# Patient Record
Sex: MALE | Race: WHITE | NOT HISPANIC OR LATINO | Employment: OTHER | URBAN - METROPOLITAN AREA
[De-identification: names, ages, dates, MRNs, and addresses within clinical notes are randomized per-mention and may not be internally consistent; named-entity substitution may affect disease eponyms.]

---

## 2018-02-06 ENCOUNTER — TRANSCRIBE ORDERS (OUTPATIENT)
Dept: LAB | Facility: CLINIC | Age: 63
End: 2018-02-06

## 2018-02-06 ENCOUNTER — APPOINTMENT (OUTPATIENT)
Dept: LAB | Facility: CLINIC | Age: 63
End: 2018-02-06
Payer: COMMERCIAL

## 2018-02-06 ENCOUNTER — OFFICE VISIT (OUTPATIENT)
Dept: LAB | Facility: CLINIC | Age: 63
End: 2018-02-06
Payer: COMMERCIAL

## 2018-02-06 VITALS
HEART RATE: 71 BPM | BODY MASS INDEX: 28.13 KG/M2 | WEIGHT: 231 LBS | HEIGHT: 76 IN | SYSTOLIC BLOOD PRESSURE: 163 MMHG | DIASTOLIC BLOOD PRESSURE: 78 MMHG

## 2018-02-06 DIAGNOSIS — S83.231A COMPLEX TEAR OF MEDIAL MENISCUS OF RIGHT KNEE AS CURRENT INJURY, INITIAL ENCOUNTER: ICD-10-CM

## 2018-02-06 DIAGNOSIS — M17.11 PRIMARY LOCALIZED OSTEOARTHRITIS OF RIGHT KNEE: ICD-10-CM

## 2018-02-06 DIAGNOSIS — S83.231A COMPLEX TEAR OF MEDIAL MENISCUS OF RIGHT KNEE AS CURRENT INJURY, INITIAL ENCOUNTER: Primary | ICD-10-CM

## 2018-02-06 LAB
ANION GAP SERPL CALCULATED.3IONS-SCNC: 7 MMOL/L (ref 4–13)
BUN SERPL-MCNC: 18 MG/DL (ref 5–25)
CALCIUM SERPL-MCNC: 9.1 MG/DL (ref 8.3–10.1)
CHLORIDE SERPL-SCNC: 106 MMOL/L (ref 100–108)
CO2 SERPL-SCNC: 28 MMOL/L (ref 21–32)
CREAT SERPL-MCNC: 0.95 MG/DL (ref 0.6–1.3)
GFR SERPL CREATININE-BSD FRML MDRD: 84 ML/MIN/1.73SQ M
GLUCOSE SERPL-MCNC: 84 MG/DL (ref 65–140)
POTASSIUM SERPL-SCNC: 3.9 MMOL/L (ref 3.5–5.3)
SODIUM SERPL-SCNC: 141 MMOL/L (ref 136–145)

## 2018-02-06 PROCEDURE — 93005 ELECTROCARDIOGRAM TRACING: CPT

## 2018-02-06 PROCEDURE — 36415 COLL VENOUS BLD VENIPUNCTURE: CPT

## 2018-02-06 PROCEDURE — 80048 BASIC METABOLIC PNL TOTAL CA: CPT

## 2018-02-06 PROCEDURE — 99204 OFFICE O/P NEW MOD 45 MIN: CPT | Performed by: ORTHOPAEDIC SURGERY

## 2018-02-06 RX ORDER — BUPROPION HYDROCHLORIDE 150 MG/1
150 TABLET, EXTENDED RELEASE ORAL DAILY
COMMUNITY

## 2018-02-06 RX ORDER — LISINOPRIL AND HYDROCHLOROTHIAZIDE 12.5; 1 MG/1; MG/1
1 TABLET ORAL DAILY
Refills: 0 | COMMUNITY
Start: 2018-01-14

## 2018-02-06 RX ORDER — LISINOPRIL 5 MG/1
5 TABLET ORAL DAILY
COMMUNITY
End: 2018-02-08

## 2018-02-07 LAB
ATRIAL RATE: 64 BPM
P AXIS: 23 DEGREES
PR INTERVAL: 180 MS
QRS AXIS: -15 DEGREES
QRSD INTERVAL: 130 MS
QT INTERVAL: 424 MS
QTC INTERVAL: 437 MS
T WAVE AXIS: 19 DEGREES
VENTRICULAR RATE: 64 BPM

## 2018-02-07 PROCEDURE — 93010 ELECTROCARDIOGRAM REPORT: CPT | Performed by: INTERNAL MEDICINE

## 2018-02-08 RX ORDER — TADALAFIL 10 MG/1
10 TABLET ORAL DAILY PRN
Status: ON HOLD | COMMUNITY
End: 2018-03-08 | Stop reason: ALTCHOICE

## 2018-02-08 NOTE — PRE-PROCEDURE INSTRUCTIONS
Pre-Surgery Instructions:   Medication Instructions    buPROPion (ZYBAN) 150 MG 12 hr tablet Instructed patient per Anesthesia Guidelines   lisinopril-hydrochlorothiazide (PRINZIDE,ZESTORETIC) 10-12 5 MG per tablet Instructed patient per Anesthesia Guidelines   tadalafil (CIALIS) 10 MG tablet Instructed patient per Anesthesia Guidelines   VILAZODONE HCL PO Instructed patient per Anesthesia Guidelines  Pre op and bathing instructions reviewed   Pt has hibiclens

## 2018-02-22 ENCOUNTER — ANESTHESIA EVENT (OUTPATIENT)
Dept: PERIOP | Facility: AMBULARY SURGERY CENTER | Age: 63
End: 2018-02-22
Payer: COMMERCIAL

## 2018-03-08 ENCOUNTER — HOSPITAL ENCOUNTER (OUTPATIENT)
Facility: AMBULARY SURGERY CENTER | Age: 63
Setting detail: OUTPATIENT SURGERY
Discharge: HOME | End: 2018-03-08
Attending: ORTHOPAEDIC SURGERY | Admitting: ORTHOPAEDIC SURGERY
Payer: COMMERCIAL

## 2018-03-08 ENCOUNTER — ANESTHESIA (OUTPATIENT)
Dept: PERIOP | Facility: AMBULARY SURGERY CENTER | Age: 63
End: 2018-03-08
Payer: COMMERCIAL

## 2018-03-08 VITALS
HEIGHT: 72 IN | SYSTOLIC BLOOD PRESSURE: 110 MMHG | DIASTOLIC BLOOD PRESSURE: 61 MMHG | WEIGHT: 232 LBS | RESPIRATION RATE: 18 BRPM | OXYGEN SATURATION: 95 % | HEART RATE: 67 BPM | TEMPERATURE: 97.2 F | BODY MASS INDEX: 31.42 KG/M2

## 2018-03-08 DIAGNOSIS — S83.231A COMPLEX TEAR OF MEDIAL MENISCUS OF RIGHT KNEE AS CURRENT INJURY, INITIAL ENCOUNTER: Primary | ICD-10-CM

## 2018-03-08 PROBLEM — M17.11 PRIMARY OSTEOARTHRITIS OF RIGHT KNEE: Chronic | Status: ACTIVE | Noted: 2018-02-06

## 2018-03-08 PROCEDURE — 29881 ARTHRS KNE SRG MNISECTMY M/L: CPT | Performed by: ORTHOPAEDIC SURGERY

## 2018-03-08 RX ORDER — ONDANSETRON 2 MG/ML
4 INJECTION INTRAMUSCULAR; INTRAVENOUS ONCE AS NEEDED
Status: DISCONTINUED | OUTPATIENT
Start: 2018-03-08 | End: 2018-03-08 | Stop reason: HOSPADM

## 2018-03-08 RX ORDER — SODIUM CHLORIDE 9 MG/ML
100 INJECTION, SOLUTION INTRAVENOUS CONTINUOUS
Status: CANCELLED | OUTPATIENT
Start: 2018-03-08 | End: 2018-03-08

## 2018-03-08 RX ORDER — HYDROCODONE BITARTRATE AND ACETAMINOPHEN 5; 325 MG/1; MG/1
TABLET ORAL
Qty: 25 TABLET | Refills: 0 | Status: SHIPPED | OUTPATIENT
Start: 2018-03-08 | End: 2018-03-13 | Stop reason: SDUPTHER

## 2018-03-08 RX ORDER — MORPHINE SULFATE 2 MG/ML
2 INJECTION, SOLUTION INTRAMUSCULAR; INTRAVENOUS EVERY 2 HOUR PRN
Status: DISCONTINUED | OUTPATIENT
Start: 2018-03-08 | End: 2018-03-08 | Stop reason: HOSPADM

## 2018-03-08 RX ORDER — HYDROCODONE BITARTRATE AND ACETAMINOPHEN 5; 325 MG/1; MG/1
2 TABLET ORAL EVERY 4 HOURS PRN
Status: DISCONTINUED | OUTPATIENT
Start: 2018-03-08 | End: 2018-03-08 | Stop reason: HOSPADM

## 2018-03-08 RX ORDER — MIDAZOLAM HYDROCHLORIDE 1 MG/ML
INJECTION INTRAMUSCULAR; INTRAVENOUS AS NEEDED
Status: DISCONTINUED | OUTPATIENT
Start: 2018-03-08 | End: 2018-03-08 | Stop reason: SURG

## 2018-03-08 RX ORDER — ONDANSETRON 2 MG/ML
4 INJECTION INTRAMUSCULAR; INTRAVENOUS EVERY 6 HOURS PRN
Status: DISCONTINUED | OUTPATIENT
Start: 2018-03-08 | End: 2018-03-08 | Stop reason: HOSPADM

## 2018-03-08 RX ORDER — PROPOFOL 10 MG/ML
INJECTION, EMULSION INTRAVENOUS AS NEEDED
Status: DISCONTINUED | OUTPATIENT
Start: 2018-03-08 | End: 2018-03-08 | Stop reason: SURG

## 2018-03-08 RX ORDER — SODIUM CHLORIDE 9 MG/ML
INJECTION, SOLUTION INTRAVENOUS CONTINUOUS PRN
Status: DISCONTINUED | OUTPATIENT
Start: 2018-03-08 | End: 2018-03-08 | Stop reason: SURG

## 2018-03-08 RX ORDER — ONDANSETRON 2 MG/ML
INJECTION INTRAMUSCULAR; INTRAVENOUS AS NEEDED
Status: DISCONTINUED | OUTPATIENT
Start: 2018-03-08 | End: 2018-03-08 | Stop reason: SURG

## 2018-03-08 RX ORDER — ACETAMINOPHEN 325 MG/1
650 TABLET ORAL EVERY 4 HOURS PRN
Status: DISCONTINUED | OUTPATIENT
Start: 2018-03-08 | End: 2018-03-08 | Stop reason: HOSPADM

## 2018-03-08 RX ORDER — LIDOCAINE HYDROCHLORIDE 10 MG/ML
INJECTION, SOLUTION INFILTRATION; PERINEURAL AS NEEDED
Status: DISCONTINUED | OUTPATIENT
Start: 2018-03-08 | End: 2018-03-08 | Stop reason: SURG

## 2018-03-08 RX ORDER — FENTANYL CITRATE/PF 50 MCG/ML
25 SYRINGE (ML) INJECTION
Status: DISCONTINUED | OUTPATIENT
Start: 2018-03-08 | End: 2018-03-08 | Stop reason: HOSPADM

## 2018-03-08 RX ORDER — EPHEDRINE SULFATE 50 MG/ML
INJECTION, SOLUTION INTRAVENOUS AS NEEDED
Status: DISCONTINUED | OUTPATIENT
Start: 2018-03-08 | End: 2018-03-08 | Stop reason: SURG

## 2018-03-08 RX ORDER — FENTANYL CITRATE 50 UG/ML
INJECTION, SOLUTION INTRAMUSCULAR; INTRAVENOUS AS NEEDED
Status: DISCONTINUED | OUTPATIENT
Start: 2018-03-08 | End: 2018-03-08 | Stop reason: SURG

## 2018-03-08 RX ADMIN — EPHEDRINE SULFATE 10 MG: 50 INJECTION, SOLUTION INTRAMUSCULAR; INTRAVENOUS; SUBCUTANEOUS at 08:09

## 2018-03-08 RX ADMIN — EPHEDRINE SULFATE 10 MG: 50 INJECTION, SOLUTION INTRAMUSCULAR; INTRAVENOUS; SUBCUTANEOUS at 07:49

## 2018-03-08 RX ADMIN — ONDANSETRON 4 MG: 2 INJECTION INTRAMUSCULAR; INTRAVENOUS at 07:40

## 2018-03-08 RX ADMIN — PROPOFOL 200 MG: 10 INJECTION, EMULSION INTRAVENOUS at 07:33

## 2018-03-08 RX ADMIN — MIDAZOLAM HYDROCHLORIDE 2 MG: 1 INJECTION, SOLUTION INTRAMUSCULAR; INTRAVENOUS at 07:30

## 2018-03-08 RX ADMIN — HYDROCODONE BITARTRATE AND ACETAMINOPHEN 2 TABLET: 5; 325 TABLET ORAL at 09:09

## 2018-03-08 RX ADMIN — LIDOCAINE HYDROCHLORIDE 50 MG: 10 INJECTION, SOLUTION INFILTRATION; PERINEURAL at 07:33

## 2018-03-08 RX ADMIN — SODIUM CHLORIDE: 0.9 INJECTION, SOLUTION INTRAVENOUS at 07:02

## 2018-03-08 RX ADMIN — CEFAZOLIN SODIUM 2000 MG: 2 SOLUTION INTRAVENOUS at 07:39

## 2018-03-08 RX ADMIN — FENTANYL CITRATE 100 MCG: 50 INJECTION, SOLUTION INTRAMUSCULAR; INTRAVENOUS at 07:33

## 2018-03-08 RX ADMIN — DEXAMETHASONE SODIUM PHOSPHATE 10 MG: 10 INJECTION INTRAMUSCULAR; INTRAVENOUS at 07:40

## 2018-03-08 NOTE — ANESTHESIA POSTPROCEDURE EVALUATION
Post-Op Assessment Note      CV Status:  Stable    Mental Status:  Alert and awake    Hydration Status:  Euvolemic    PONV Controlled:  Controlled    Airway Patency:  Patent    Post Op Vitals Reviewed: Yes          Staff: CRNA           BP   109/53   Temp 97 9 °F (36 6 °C) (03/08/18 0825)    Pulse     Resp 16 (03/08/18 0825)    SpO2 97 % (03/08/18 0825)

## 2018-03-08 NOTE — OP NOTE
OPERATIVE REPORT  PATIENT NAME: Maliha Kumar    :  1955  MRN: [id number]  Pt Location: [facility]  OR ROOM 06    SURGERY DATE: 3/8/2018    Surgeon(s) and Role:     * Genesis Falnnery MD - Primary    Preop Diagnosis:  Complex tear of medial meniscus of right knee as current injury, initial encounter [S83 231A]    Post-Op Diagnosis Codes:     * Complex tear of medial meniscus of right knee as current injury, initial encounter [S83 231A]     * Primary osteoarthritis of right knee [M17 11]    Procedure(s) (LRB):  RIGHT KNEE ARTHROSCOPIC PARTIAL MEDIAL MENISCECTOMY    Specimen(s):  * No specimens in log *    Estimated Blood Loss:   Minimal    Drains:  None    Anesthesia Type:   General    Complications:   None    Procedure and Technique:  The patient was identified in the pre-operative holding area, and the surgical site was marked by the surgeon  The patient was transported to the operating room and placed in the supine position on the OR table  General anesthesia was administered  The right knee was prepped and draped in the usual sterile fashion  Prophylactic antibiotics were given within 1 hour of incision  Following a surgical timeout, the portal sites were injected with 10 mL of a 1:1 mixture of 0 25% bupivacaine and 1% lidocaine with epinephrine  An anterolateral portal was established with the 15-blade scalpel  The 4 mm 30-degree arthroscopic camera was placed in the medial compartment  An anteromedial portal was established under direct visualization  The medial meniscus demonstrated extensive complex tearing of the posterior horn and body  The articular surfaces of the medial compartment demonstrated moderate degenerative changes with a large area of grade 3 chondromalacia on the medial femoral condyle  Grade 2 chondromalacia was present diffusely on the medial tibial plateau  Partial medial meniscectomy was performed with the basket forceps and arthroscopic shaver    The medial meniscus was debrided back to a stable margin  The anterior cruciate ligament demonstrated degenerative fraying but was otherwise intact and stable with arthroscopic probing  Small osteophytes were present within the intercondylar notch  The lateral meniscus was intact  The articular surfaces of the lateral compartment demonstrated mild degenerative changes with grade 2 chondromalacia on the lateral tibial plateau  The articular surfaces of the patellofemoral compartment demonstrated mild degenerative changes with grade 2 chondromalacia diffusely on the patella and trochlea  The knee was irrigated extensively with the arthroscopic irrigation fluid  The portal sites were closed with simple buried 4-0 Monocryl suture  Steri-Strips were placed to reinforce the closure  The knee was injected with 20 mL of the local anesthetic mixture  The wounds were dressed with sterile 4 x 4 gauze, Webril, and an Ace bandage  Anesthesia was reversed, and the patient was extubated      Patient Disposition:  PACU  and extubated and stable    SIGNATURE: Penelope Curran MD  DATE: March 8, 2018  TIME: 8:22 AM

## 2018-03-08 NOTE — ANESTHESIA PREPROCEDURE EVALUATION
Review of Systems/Medical History      No history of anesthetic complications     Cardiovascular  Hypertension ,    Pulmonary  Negative pulmonary ROS        GI/Hepatic  Negative GI/hepatic ROS          Negative  ROS        Endo/Other  Negative endo/other ROS      GYN       Hematology   Musculoskeletal    Arthritis     Neurology  Negative neurology ROS      Psychology   Anxiety, Depression ,              Physical Exam    Airway    Mallampati score: II  TM Distance: >3 FB  Neck ROM: full     Dental       Cardiovascular      Pulmonary      Other Findings        Anesthesia Plan  ASA Score- 2     Anesthesia Type- general with ASA Monitors  Additional Monitors:   Airway Plan:     Comment: General anesthesia, LMA; standard ASA monitors  Risks and benefits discussed with patient; patient consented and agrees to proceed  I saw and evaluated the patient  If seen with CRNA, we have discussed the anesthetic plan and I am in agreement that the plan is appropriate for the patient        Plan Factors-    Induction- intravenous  Postoperative Plan- Plan for postoperative opioid use  Informed Consent- Anesthetic plan and risks discussed with patient  I personally reviewed this patient with the CRNA  Discussed and agreed on the Anesthesia Plan with the CRNA  Corby Howard

## 2018-03-08 NOTE — H&P
Patient Name:  Alger Bosworth  MRN:  [id number]    Assessment & Plan    Right knee medial meniscus tear, possible ACL tear for right knee arthroscopic partial medial meniscectomy      Chief Complaint    Right knee pain      History of the Present Illness    49-year-old male with right knee pain since November 2017  Symptoms started suddenly after waking up from a colonoscopy  No known injury  MRI with complex tear medial meniscus and poorly visualized ACL  Moderate degenerative changes of the medial compartment were also noted on MRI  Review of Systems    General:  Negative for fever, lethargy/malaise, or night sweats  Respiratory:  Negative for cough or wheezing  Cardiovascular:  Negative for chest pain, dyspnea on exertion, or palpitations  Musculoskeletal:  As stated in the HPI and otherwise negative  Physical Exam    /76   Pulse 67   Temp (!) 97 2 °F (36 2 °C) (Temporal)   Resp 17   Ht 6' (1 829 m)   Wt 105 kg (232 lb)   SpO2 96%   BMI 31 46 kg/m²     Right knee:  Medial joint line tenderness  Range of motion is -5 degrees of extension and 125 degrees of flexion  Trace laxity with Lachman test     Constitutional:  Well-developed and well-nourished  Lungs:  Clear to auscultation bilaterally  Heart:  Regular rate and rhythm with audible S1 and S2   Skin:  Intact without erythema        Lab Results   Component Value Date     02/06/2018    K 3 9 02/06/2018    CO2 28 02/06/2018     02/06/2018    BUN 18 02/06/2018    CREATININE 0 95 02/06/2018    GLUCOSE 84 02/06/2018    CALCIUM 9 1 02/06/2018         Past Medical History:   Diagnosis Date    Anxiety     Arthritis     Depression     Hypertension        Past Surgical History:   Procedure Laterality Date    COLONOSCOPY      DENTAL SURGERY      KNEE ARTHROSCOPY Right 1992    menisectomy    KNEE SURGERY      PILONIDAL CYST DRAINAGE      UPPER GASTROINTESTINAL ENDOSCOPY         No Known Allergies    No current facility-administered medications on file prior to encounter        Current Outpatient Prescriptions on File Prior to Encounter   Medication Sig Dispense Refill    buPROPion (ZYBAN) 150 MG 12 hr tablet Take 150 mg by mouth daily        lisinopril-hydrochlorothiazide (PRINZIDE,ZESTORETIC) 10-12 5 MG per tablet Take 1 tablet by mouth daily  0    [DISCONTINUED] VILAZODONE HCL PO Take 20 mg by mouth           Social History   Substance Use Topics    Smoking status: Former Smoker     Quit date: 2/8/1992    Smokeless tobacco: Never Used    Alcohol use Yes      Comment: once weekly       Family History   Problem Relation Age of Onset    Hypertension Sister     Diabetes Sister

## 2018-03-13 DIAGNOSIS — S83.231A COMPLEX TEAR OF MEDIAL MENISCUS OF RIGHT KNEE AS CURRENT INJURY, INITIAL ENCOUNTER: ICD-10-CM

## 2018-03-13 RX ORDER — HYDROCODONE BITARTRATE AND ACETAMINOPHEN 5; 325 MG/1; MG/1
TABLET ORAL
Qty: 20 TABLET | Refills: 0 | Status: SHIPPED | OUTPATIENT
Start: 2018-03-13

## 2018-03-13 NOTE — TELEPHONE ENCOUNTER
Caller: Dr Eric Serrano patient  Ph: [phone]  Patient is requesting a refill for pain medicine from the right knee surgery he had  He uses [facility], [address] [place] [address]   Store # [id number]

## 2018-03-13 NOTE — TELEPHONE ENCOUNTER
Called patient let patient know script is ready to be picked up at [facility]

## 2018-03-21 NOTE — TELEPHONE ENCOUNTER
Dr Kandi Aguilar patient    John Carrera called to r/s his appt for today due to the weather  He is a post op patient and was told he could reschedule with Jenny Loving this week however I do not have access to that schedule  Best contact # [phone]    Thank you

## 2018-03-22 NOTE — TELEPHONE ENCOUNTER
I do not have my own schedule  He will need to be on dr Susan Estrella schedule   I work with dr Tomas Sumner

## 2018-03-23 ENCOUNTER — OFFICE VISIT (OUTPATIENT)
Dept: OBGYN CLINIC | Facility: CLINIC | Age: 63
End: 2018-03-23

## 2018-03-23 VITALS
DIASTOLIC BLOOD PRESSURE: 88 MMHG | BODY MASS INDEX: 28.01 KG/M2 | HEIGHT: 76 IN | SYSTOLIC BLOOD PRESSURE: 135 MMHG | HEART RATE: 73 BPM | WEIGHT: 230 LBS

## 2018-03-23 DIAGNOSIS — S83.231D COMPLEX TEAR OF MEDIAL MENISCUS OF RIGHT KNEE AS CURRENT INJURY, SUBSEQUENT ENCOUNTER: Primary | ICD-10-CM

## 2018-03-23 PROCEDURE — 99024 POSTOP FOLLOW-UP VISIT: CPT | Performed by: ORTHOPAEDIC SURGERY

## 2018-03-23 RX ORDER — INFLUENZA A VIRUS A/SINGAPORE/GP1908/2015 IVR-180A (H1N1) ANTIGEN (PROPIOLACTONE INACTIVATED), INFLUENZA A VIRUS A/HONG KONG/4801/2014 X-263B (H3N2) ANTIGEN (PROPIOLACTONE INACTIVATED), INFLUENZA B VIRUS B/BRISBANE/46/2015 ANTIGEN (PROPIOLACTONE INACTIVATED), AND INFLUENZA B VIRUS B/PHUKET/3073/2013 BVR-1B ANTIGEN (PROPIOLACTONE INACTIVATED) 15; 15; 15; 15 UG/.5ML; UG/.5ML; UG/.5ML; UG/.5ML
INJECTION, SUSPENSION INTRAMUSCULAR
Refills: 0 | COMMUNITY
Start: 2018-02-08

## 2018-03-23 RX ORDER — LORAZEPAM 1 MG/1
1 TABLET ORAL 3 TIMES DAILY PRN
Refills: 0 | COMMUNITY
Start: 2018-02-27

## 2018-03-23 RX ORDER — VILAZODONE HYDROCHLORIDE 20 MG/1
20 TABLET ORAL DAILY
Refills: 0 | COMMUNITY
Start: 2018-02-20

## 2018-03-23 NOTE — PROGRESS NOTES
Patient Name:  Sonali Murphy  MRN:  [id number]    Assessment & Plan    Right knee arthroscopic partial medial meniscectomy 3/8/18  1  Weightbearing as tolerated right lower extremity  2  Gradually return to normal activities as tolerated  3  NSAIDs as needed  4  Follow up in four weeks  Subjective    61-year-old male returns to the office today status post Right knee arthroscopic partial medial meniscectomy 3/8/18  Today he notes soreness about the right knee  He states his pain has improved significantly  No incisional erythema or drainage  He notes mild swelling which is improving  No significant stiffness  No weakness or instability  Objective    /88   Pulse 73   Ht 6' 4" (1 93 m)   Wt 104 kg (230 lb)   BMI 28 00 kg/m²     Right knee:  No gross deformity  Skin intact  Fully healed incisions that erythema or drainage  Small effusion  No significant joint line tenderness  Full range of motion without discomfort  Stable to varus and valgus stress  Sensation intact right lower extremity

## 2018-04-20 ENCOUNTER — OFFICE VISIT (OUTPATIENT)
Dept: OBGYN CLINIC | Facility: CLINIC | Age: 63
End: 2018-04-20

## 2018-04-20 VITALS
DIASTOLIC BLOOD PRESSURE: 84 MMHG | HEART RATE: 66 BPM | SYSTOLIC BLOOD PRESSURE: 134 MMHG | HEIGHT: 76 IN | BODY MASS INDEX: 28.86 KG/M2 | WEIGHT: 237 LBS

## 2018-04-20 DIAGNOSIS — M17.11 PRIMARY OSTEOARTHRITIS OF RIGHT KNEE: Chronic | ICD-10-CM

## 2018-04-20 DIAGNOSIS — S83.231D COMPLEX TEAR OF MEDIAL MENISCUS OF RIGHT KNEE AS CURRENT INJURY, SUBSEQUENT ENCOUNTER: Primary | ICD-10-CM

## 2018-04-20 PROCEDURE — 99024 POSTOP FOLLOW-UP VISIT: CPT | Performed by: ORTHOPAEDIC SURGERY

## 2018-04-20 RX ORDER — LOSARTAN POTASSIUM AND HYDROCHLOROTHIAZIDE 12.5; 1 MG/1; MG/1
1 TABLET ORAL DAILY
Refills: 0 | COMMUNITY
Start: 2018-03-26

## 2019-12-03 ENCOUNTER — TRANSCRIBE ORDERS (OUTPATIENT)
Dept: ADMINISTRATIVE | Facility: HOSPITAL | Age: 64
End: 2019-12-03

## 2019-12-03 DIAGNOSIS — R00.2 PALPITATIONS: ICD-10-CM

## 2019-12-03 DIAGNOSIS — R42 DIZZINESS AND GIDDINESS: Primary | ICD-10-CM

## 2020-01-03 ENCOUNTER — HOSPITAL ENCOUNTER (OUTPATIENT)
Dept: NON INVASIVE DIAGNOSTICS | Facility: HOSPITAL | Age: 65
Discharge: HOME | End: 2020-01-03
Payer: MEDICARE

## 2020-01-03 DIAGNOSIS — R00.2 PALPITATIONS: ICD-10-CM

## 2020-01-03 DIAGNOSIS — R42 DIZZINESS AND GIDDINESS: ICD-10-CM

## 2020-01-03 LAB
CHEST PAIN STATEMENT: NORMAL
MAX DIASTOLIC BP: 78 MMHG
MAX HEART RATE: 142 BPM
MAX PREDICTED HEART RATE: 154 BPM
MAX. SYSTOLIC BP: 184 MMHG
PROTOCOL NAME: NORMAL
TARGET HR FORMULA: NORMAL
TEST INDICATION: NORMAL
TIME IN EXERCISE PHASE: NORMAL

## 2020-01-03 PROCEDURE — 93306 TTE W/DOPPLER COMPLETE: CPT | Performed by: INTERNAL MEDICINE

## 2020-01-03 PROCEDURE — 93016 CV STRESS TEST SUPVJ ONLY: CPT | Performed by: INTERNAL MEDICINE

## 2020-01-03 PROCEDURE — 93306 TTE W/DOPPLER COMPLETE: CPT

## 2020-01-03 PROCEDURE — 93017 CV STRESS TEST TRACING ONLY: CPT

## 2020-01-03 PROCEDURE — 93018 CV STRESS TEST I&R ONLY: CPT | Performed by: INTERNAL MEDICINE

## (undated) DEVICE — SUT MONOCRYL 3-0 PS-2 18 IN Y497G

## (undated) DEVICE — ACE WRAP 6 IN UNSTERILE

## (undated) DEVICE — INTENDED FOR TISSUE SEPARATION, AND OTHER PROCEDURES THAT REQUIRE A SHARP SURGICAL BLADE TO PUNCTURE OR CUT.: Brand: BARD-PARKER ® CARBON RIB-BACK BLADES

## (undated) DEVICE — VIAL DECANTER

## (undated) DEVICE — GLOVE INDICATOR PI UNDERGLOVE SZ 8.5 BLUE

## (undated) DEVICE — IMPERVIOUS STOCKINETTE: Brand: DEROYAL

## (undated) DEVICE — TUBING ARTHROSCOPY REDUCE PATIENT

## (undated) DEVICE — NEEDLE 22 G X 1 1/2 SAFETY

## (undated) DEVICE — PADDING CAST 4 IN  COTTON STRL

## (undated) DEVICE — LIGHT GLOVE GREEN

## (undated) DEVICE — CHLORAPREP HI-LITE 26ML ORANGE

## (undated) DEVICE — TUBING SUCTION 5MM X 12 FT

## (undated) DEVICE — PACK UNIVERSAL ARTHRSCOPY PBDS

## (undated) DEVICE — BLADE SHAVER DISSECTOR 3.5MM 13CM COOLCUT

## (undated) DEVICE — GLOVE SRG BIOGEL 8.5

## (undated) DEVICE — 3M™ STERI-STRIP™ REINFORCED ADHESIVE SKIN CLOSURES, R1547, 1/2 IN X 4 IN (12 MM X 100 MM), 6 STRIPS/ENVELOPE: Brand: 3M™ STERI-STRIP™

## (undated) DEVICE — SCD SEQUENTIAL COMPRESSION COMFORT SLEEVE MEDIUM KNEE LENGTH: Brand: KENDALL SCD

## (undated) DEVICE — SYRINGE 20ML LL

## (undated) DEVICE — GAUZE SPONGES,16 PLY: Brand: CURITY

## (undated) DEVICE — BLADE SHAVER EXCALIBUR 4MM 13CM COOLCUT